# Patient Record
(demographics unavailable — no encounter records)

---

## 2025-02-07 NOTE — DEVELOPMENTAL MILESTONES
[Normal Development] : Normal Development [Engages with others for play] : engages with others for play [Help dress and undress self] : help dress and undress self [Points to pictures in book] : points to pictures in book [Points to object of interest to] : points to object of interest to draw attention to it [Turns and looks at adult if] : turns and looks at adult if something new happens [Begins to scoop with spoon] : begins to scoop with spoon [Uses 6 to 10 words other than] : uses 6 to 10 words other than names [Identifies at least 2 body parts] : identifies at least 2 body parts [Walks up with 2 feet per step] : walks up with 2 feet per step with hand held [Sits in small chair] : sits in small chair [Carries toy while walking] : carries toy while walking [Scribbles spontaneously] : scribbles spontaneously

## 2025-02-07 NOTE — PHYSICAL EXAM
[Alert] : alert [No Acute Distress] : no acute distress [Normocephalic] : normocephalic [Anterior [place] Closed] : anterior fontanelle closed [PERRL] : PERRL [Normally Placed Ears] : normally placed ears [Auricles Well Formed] : auricles well formed [Clear Tympanic membranes with present light reflex and bony landmarks] : clear tympanic membranes with present light reflex and bony landmarks [No Discharge] : no discharge [Nares Patent] : nares patent [Palate Intact] : palate intact [Uvula Midline] : uvula midline [Tooth Eruption] : tooth eruption  [Supple, full passive range of motion] : supple, full passive range of motion [No Palpable Masses] : no palpable masses [Symmetric Chest Rise] : symmetric chest rise [Clear to Auscultation Bilaterally] : clear to auscultation bilaterally [Regular Rate and Rhythm] : regular rate and rhythm [S1, S2 present] : S1, S2 present [No Murmurs] : no murmurs [+2 Femoral Pulses] : +2 femoral pulses [Soft] : soft [NonTender] : non tender [Non Distended] : non distended [Normoactive Bowel Sounds] : normoactive bowel sounds [No Hepatomegaly] : no hepatomegaly [No Splenomegaly] : no splenomegaly [Patent] : patent [Normally Placed] : normally placed [No Abnormal Lymph Nodes Palpated] : no abnormal lymph nodes palpated [No Clavicular Crepitus] : no clavicular crepitus [Cranial Nerves Grossly Intact] : cranial nerves grossly intact [No Rash or Lesions] : no rash or lesions

## 2025-02-07 NOTE — DISCUSSION/SUMMARY
[FreeTextEntry1] : 21 month old F presenting for HCM. Growth and development normal. PE unremarkable. MCHAT screen passed. Immunizations UTD.  - Routine care & anticipatory guidance given - Vaccines given: Hep A second dose - Post vaccine care discussed & potential side effects reviewed - Tylenol every 4 hours prn or Motrin every 6 hours prn for pain or fever - Choking hazards reviewed - Follow up with dental as planned - RTC for 24 month old HCM and prn - Advised to restrict cow's milk below <16oz, as cow's milk can lead to iron deficiency anemia   Caretaker expressed understanding of the plan and agrees. All questions were answered.

## 2025-02-07 NOTE — HISTORY OF PRESENT ILLNESS
[Mother] : mother [Cow's milk (Ounces per day ___)] : consumes [unfilled] oz of Cow's milk per day [Fruit] : fruit [Vegetables] : vegetables [Meat] : meat [Cereal] : cereal [Eggs] : eggs [Baby food] : baby food [Finger Foods] : finger foods [Table food] : table food [___ stools per day] : [unfilled]  stools per day [___ voids per day] : [unfilled] voids per day [Normal] : Normal [In crib] : In crib [Wakes up at night] : Wakes up at night [Sippy cup use] : Sippy cup use [Bottle in bed] : Bottle in bed [Tap water] : Primary Fluoride Source: Tap water [Playtime] : Playtime  [Ready for Toilet Training] : ready for toilet training [No] : Not at  exposure [Car seat in back seat] : Car seat in back seat [Carbon Monoxide Detectors] : Carbon monoxide detectors [Smoke Detectors] : Smoke detectors [Up to date] : Up to date [Exposure to electronic nicotine delivery system] : No exposure to electronic nicotine delivery system [FreeTextEntry1] : 21 month old female presenting for WCC. No parental concerns. No recent illnesses, no fever, cough, congestion.

## 2025-05-03 NOTE — PHYSICAL EXAM

## 2025-05-03 NOTE — PHYSICAL EXAM

## 2025-05-05 NOTE — DISCUSSION/SUMMARY
[Normal Growth] : growth [Normal Development] : development [None] : No known medical problems [No Elimination Concerns] : elimination [No Feeding Concerns] : feeding [No Skin Concerns] : skin [Normal Sleep Pattern] : sleep [No Medications] : ~He/She~ is not on any medications [Parent/Guardian] : parent/guardian [] : The components of the vaccine(s) to be administered today are listed in the plan of care. The disease(s) for which the vaccine(s) are intended to prevent and the risks have been discussed with the caretaker.  The risks are also included in the appropriate vaccination information statements which have been provided to the patient's caregiver.  The caregiver has given consent to vaccinate. [FreeTextEntry1] : Impression:   No growth, development, elimination, feeding, skin and sleep concerns. No known medical problems.     She is not on any medications.   Information discussed with parent/guardian.  3 yo  old F presenting for HCM. Growth and development normal. PE unremarkable. MCHAT screen passed. Immunizations UTD. 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 - RTC for 30 month old HCM and prn - Advised to change to 2% but continue to restrict cow's milk below <16 oz.

## 2025-05-05 NOTE — DEVELOPMENTAL MILESTONES
[Plays alongside other children] : plays alongside other children [Takes off some clothing] : takes off some clothing [Scoops well with spoon] : scoops well with spoon [Uses 50 words] : uses 50 words [Combine 2 words into phrase or] : combines 2 words into phrase or sentences [Follows 2-step command] : follows 2-step command [Uses words that are 50% intelligible] : uses words that are 50% intelligible to strangers [Kicks ball] : kicks ball  [Jumps off ground with 2 feet] : jumps off ground with 2 feet [Climbs up a ladder at a] : climbs up a ladder at a playground [Stacks objects] : stacks objects [Turns book pages] : turns book pages [Uses hands to turn objects] : uses hands to turn objects [Passed] : passed [Runs with coordination] : does not run with coordination [FreeTextEntry1] : >1

## 2025-05-05 NOTE — HISTORY OF PRESENT ILLNESS
[Cow's milk (Ounces per day ___)] : consumes [unfilled] oz of Cow's milk per day [Fruit] : fruit [Vegetables] : vegetables [Meat] : meat [Cereal] : cereal [Eggs] : eggs [Finger Foods] : finger foods [Table food] : table food [Dairy] : dairy [Normal] : Normal [___ stools per day] : [unfilled]  stools per day [Firm] : stools are firm consistency [In crib] : In crib [In bed] : In bed [Wakes up at night] : Wakes up at night [Sippy cup use] : Sippy cup use [Bottle in bed] : Bottle in bed [Brushing teeth] : Brushing teeth [Toothpaste] : Primary Fluoride Source: Toothpaste [In nursery school] : In nursery school [Playtime 60 min a day] : Playtime 60 min a day [Temper Tantrums] : Temper Tantrums [Toilet Training] : Toilet training [<2 hrs of screen time] : Less than 2 hrs of screen time [No] : Not at  exposure [Water heater temperature set at <120 degrees F] : Water heater temperature set at <120 degrees F [Car seat in back seat] : Car seat in back seat [Smoke Detectors] : Smoke detectors [Carbon Monoxide Detectors] : Carbon monoxide detectors [Up to date] : Up to date [NO] : No [Mother] : mother [Exposure to electronic nicotine delivery system] : No exposure to electronic nicotine delivery system [At risk for exposure to TB] : Not at risk for exposure to Tuberculosis [FreeTextEntry7] : 1 yo female here for WCC [de-identified] : Epistaxis and awkward gait [FreeTextEntry3] : 2-3x [FreeTextEntry1] : SDOH Screening Questionnaire  SDOH (Social Determinants of Health) Questionnaire: 1. Housing: Do you worry that in the upcoming months, your family, or child, may not have a safe or stable place to live? No  2. Food security: Within the last 12 months, did the food you bought not last and you did not have money to buy more? No  3. Community: Do you need help getting public benefits like food stamps or WIC? No  4. Transportation: Does your child have chronic medical condition and therefore struggle with transportation to attend medical appointments? No 5. Healthcare Access: Do you need help getting health or dental insurance? No     Result: Negative Screen. No further intervention needed.

## 2025-05-05 NOTE — DISCUSSION/SUMMARY
[Normal Growth] : growth [Normal Development] : development [None] : No known medical problems [No Elimination Concerns] : elimination [No Feeding Concerns] : feeding [No Skin Concerns] : skin [Normal Sleep Pattern] : sleep [No Medications] : ~He/She~ is not on any medications [Parent/Guardian] : parent/guardian [] : The components of the vaccine(s) to be administered today are listed in the plan of care. The disease(s) for which the vaccine(s) are intended to prevent and the risks have been discussed with the caretaker.  The risks are also included in the appropriate vaccination information statements which have been provided to the patient's caregiver.  The caregiver has given consent to vaccinate. [FreeTextEntry1] : Impression:   No growth, development, elimination, feeding, skin and sleep concerns. No known medical problems.     She is not on any medications.   Information discussed with parent/guardian.  1 yo  old F presenting for HCM. Growth and development normal. PE unremarkable. MCHAT screen passed. Immunizations UTD. 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 - RTC for 30 month old HCM and prn - Advised to change to 2% but continue to restrict cow's milk below <16 oz.

## 2025-05-05 NOTE — HISTORY OF PRESENT ILLNESS
[Cow's milk (Ounces per day ___)] : consumes [unfilled] oz of Cow's milk per day [Fruit] : fruit [Vegetables] : vegetables [Meat] : meat [Cereal] : cereal [Eggs] : eggs [Finger Foods] : finger foods [Table food] : table food [Dairy] : dairy [Normal] : Normal [___ stools per day] : [unfilled]  stools per day [Firm] : stools are firm consistency [In crib] : In crib [In bed] : In bed [Wakes up at night] : Wakes up at night [Sippy cup use] : Sippy cup use [Bottle in bed] : Bottle in bed [Brushing teeth] : Brushing teeth [Toothpaste] : Primary Fluoride Source: Toothpaste [In nursery school] : In nursery school [Playtime 60 min a day] : Playtime 60 min a day [Temper Tantrums] : Temper Tantrums [Toilet Training] : Toilet training [<2 hrs of screen time] : Less than 2 hrs of screen time [No] : Not at  exposure [Water heater temperature set at <120 degrees F] : Water heater temperature set at <120 degrees F [Car seat in back seat] : Car seat in back seat [Smoke Detectors] : Smoke detectors [Carbon Monoxide Detectors] : Carbon monoxide detectors [Up to date] : Up to date [NO] : No [Mother] : mother [Exposure to electronic nicotine delivery system] : No exposure to electronic nicotine delivery system [At risk for exposure to TB] : Not at risk for exposure to Tuberculosis [FreeTextEntry7] : 3 yo female here for WCC [de-identified] : Epistaxis and awkward gait [FreeTextEntry3] : 2-3x [FreeTextEntry1] : SDOH Screening Questionnaire  SDOH (Social Determinants of Health) Questionnaire: 1. Housing: Do you worry that in the upcoming months, your family, or child, may not have a safe or stable place to live? No  2. Food security: Within the last 12 months, did the food you bought not last and you did not have money to buy more? No  3. Community: Do you need help getting public benefits like food stamps or WIC? No  4. Transportation: Does your child have chronic medical condition and therefore struggle with transportation to attend medical appointments? No 5. Healthcare Access: Do you need help getting health or dental insurance? No     Result: Negative Screen. No further intervention needed.